# Patient Record
Sex: MALE | Race: WHITE | NOT HISPANIC OR LATINO | URBAN - METROPOLITAN AREA
[De-identification: names, ages, dates, MRNs, and addresses within clinical notes are randomized per-mention and may not be internally consistent; named-entity substitution may affect disease eponyms.]

---

## 2017-04-08 ENCOUNTER — EMERGENCY (EMERGENCY)
Facility: HOSPITAL | Age: 52
LOS: 1 days | End: 2017-04-08
Payer: MEDICAID

## 2017-04-08 VITALS
OXYGEN SATURATION: 98 % | HEART RATE: 81 BPM | SYSTOLIC BLOOD PRESSURE: 140 MMHG | DIASTOLIC BLOOD PRESSURE: 91 MMHG | RESPIRATION RATE: 15 BRPM | TEMPERATURE: 98 F

## 2017-04-08 VITALS
DIASTOLIC BLOOD PRESSURE: 92 MMHG | TEMPERATURE: 98 F | OXYGEN SATURATION: 98 % | SYSTOLIC BLOOD PRESSURE: 145 MMHG | HEART RATE: 83 BPM | HEIGHT: 72 IN | WEIGHT: 199.96 LBS | RESPIRATION RATE: 16 BRPM

## 2017-04-08 DIAGNOSIS — Z98.1 ARTHRODESIS STATUS: Chronic | ICD-10-CM

## 2017-04-08 PROCEDURE — 99283 EMERGENCY DEPT VISIT LOW MDM: CPT | Mod: 25

## 2017-04-08 PROCEDURE — 99284 EMERGENCY DEPT VISIT MOD MDM: CPT | Mod: 25

## 2017-04-08 NOTE — ED PROVIDER NOTE - MEDICAL DECISION MAKING DETAILS
Abx drops, and follow up with ophthalmologist Abx drops, and follow up with ophthalmologist. Patient had lid eversion with no evidence of FB after tetracaine 2 drops applied for topical anesthesia. UV light and fluorescein stain showed circular corenal abrasion at 6 pm  about 2 mm in diameter with conjunctival irritation below the abrasion as well that stained + with fluorescein. Pt. had 1 cm of erythromycin ointment applied to lower lid. Pt told to apply 2 drops 4 x daily(sulfacetamide drops), avoid bright light and follow up with Eye clinic on Monday.

## 2017-04-08 NOTE — ED PROVIDER NOTE - OBJECTIVE STATEMENT
50 y/o M pt presents to ED c/o left eye pain and redness s/p ceramic tile cement got into his eye while at work. He washed out his eye and tried to get everything out but still very irritated. He states it feel likes there is something in his eye.  No other injuries. No further complaints at this time. 52 y/o M pt presents to ED c/o left eye pain and redness s/p ceramic tile cement got into his eye while at work last night. He washed out his eye and tried to get everything out but still very irritated. He states it feel likes there is something in his eye.  No other injuries. No further complaints at this time.

## 2017-04-08 NOTE — ED PROVIDER NOTE - EYES, MLM
Left eye lid inverted, no obvious sign of foreign body.  corneal abrasion 6 o'clock. abrasion on lower sclera Left eye lid inverted, no obvious sign of foreign body. positive fluorescein uptake.  corneal abrasion 6 o'clock. abrasion on lower sclera Left eye lid everted, no obvious sign of foreign body. positive fluorescein uptake.  corneal abrasion 6 o'clock. abrasion on lower sclera

## 2017-04-12 ENCOUNTER — EMERGENCY (EMERGENCY)
Facility: HOSPITAL | Age: 52
LOS: 1 days | End: 2017-04-12
Attending: EMERGENCY MEDICINE | Admitting: EMERGENCY MEDICINE
Payer: MEDICAID

## 2017-04-12 VITALS
RESPIRATION RATE: 16 BRPM | SYSTOLIC BLOOD PRESSURE: 111 MMHG | HEART RATE: 88 BPM | OXYGEN SATURATION: 96 % | DIASTOLIC BLOOD PRESSURE: 69 MMHG

## 2017-04-12 VITALS
HEIGHT: 72 IN | OXYGEN SATURATION: 96 % | DIASTOLIC BLOOD PRESSURE: 90 MMHG | WEIGHT: 209.44 LBS | HEART RATE: 102 BPM | SYSTOLIC BLOOD PRESSURE: 135 MMHG | RESPIRATION RATE: 16 BRPM | TEMPERATURE: 98 F

## 2017-04-12 DIAGNOSIS — Z98.1 ARTHRODESIS STATUS: Chronic | ICD-10-CM

## 2017-04-12 LAB
ALBUMIN SERPL ELPH-MCNC: 3.7 G/DL — SIGNIFICANT CHANGE UP (ref 3.3–5)
ALP SERPL-CCNC: 70 U/L — SIGNIFICANT CHANGE UP (ref 30–120)
ALT FLD-CCNC: 108 U/L DA — HIGH (ref 10–60)
ANION GAP SERPL CALC-SCNC: 5 MMOL/L — SIGNIFICANT CHANGE UP (ref 5–17)
AST SERPL-CCNC: 65 U/L — HIGH (ref 10–40)
BASOPHILS # BLD AUTO: 0.1 K/UL — SIGNIFICANT CHANGE UP (ref 0–0.2)
BASOPHILS NFR BLD AUTO: 0.6 % — SIGNIFICANT CHANGE UP (ref 0–2)
BILIRUB SERPL-MCNC: 0.2 MG/DL — SIGNIFICANT CHANGE UP (ref 0.2–1.2)
BUN SERPL-MCNC: 19 MG/DL — SIGNIFICANT CHANGE UP (ref 7–23)
CALCIUM SERPL-MCNC: 8.9 MG/DL — SIGNIFICANT CHANGE UP (ref 8.4–10.5)
CHLORIDE SERPL-SCNC: 103 MMOL/L — SIGNIFICANT CHANGE UP (ref 96–108)
CO2 SERPL-SCNC: 30 MMOL/L — SIGNIFICANT CHANGE UP (ref 22–31)
CREAT SERPL-MCNC: 1.19 MG/DL — SIGNIFICANT CHANGE UP (ref 0.5–1.3)
EOSINOPHIL # BLD AUTO: 0 K/UL — SIGNIFICANT CHANGE UP (ref 0–0.5)
EOSINOPHIL NFR BLD AUTO: 0.1 % — SIGNIFICANT CHANGE UP (ref 0–6)
ETHANOL SERPL-MCNC: <3 MG/DL — SIGNIFICANT CHANGE UP (ref 0–3)
GLUCOSE SERPL-MCNC: 168 MG/DL — HIGH (ref 70–99)
HCT VFR BLD CALC: 41.3 % — SIGNIFICANT CHANGE UP (ref 39–50)
HGB BLD-MCNC: 14.4 G/DL — SIGNIFICANT CHANGE UP (ref 13–17)
LYMPHOCYTES # BLD AUTO: 0.6 K/UL — LOW (ref 1–3.3)
LYMPHOCYTES # BLD AUTO: 5.3 % — LOW (ref 13–44)
MCHC RBC-ENTMCNC: 31.8 PG — SIGNIFICANT CHANGE UP (ref 27–34)
MCHC RBC-ENTMCNC: 34.8 GM/DL — SIGNIFICANT CHANGE UP (ref 32–36)
MCV RBC AUTO: 91.5 FL — SIGNIFICANT CHANGE UP (ref 80–100)
MONOCYTES # BLD AUTO: 0.9 K/UL — SIGNIFICANT CHANGE UP (ref 0–0.9)
MONOCYTES NFR BLD AUTO: 7.9 % — SIGNIFICANT CHANGE UP (ref 2–14)
NEUTROPHILS # BLD AUTO: 9.7 K/UL — HIGH (ref 1.8–7.4)
NEUTROPHILS NFR BLD AUTO: 86.2 % — HIGH (ref 43–77)
PLATELET # BLD AUTO: 163 K/UL — SIGNIFICANT CHANGE UP (ref 150–400)
POTASSIUM SERPL-MCNC: 5 MMOL/L — SIGNIFICANT CHANGE UP (ref 3.5–5.3)
POTASSIUM SERPL-SCNC: 5 MMOL/L — SIGNIFICANT CHANGE UP (ref 3.5–5.3)
PROT SERPL-MCNC: 7 G/DL — SIGNIFICANT CHANGE UP (ref 6–8.3)
RBC # BLD: 4.52 M/UL — SIGNIFICANT CHANGE UP (ref 4.2–5.8)
RBC # FLD: 12.6 % — SIGNIFICANT CHANGE UP (ref 10.3–14.5)
SODIUM SERPL-SCNC: 138 MMOL/L — SIGNIFICANT CHANGE UP (ref 135–145)
WBC # BLD: 11.3 K/UL — HIGH (ref 3.8–10.5)
WBC # FLD AUTO: 11.3 K/UL — HIGH (ref 3.8–10.5)

## 2017-04-12 PROCEDURE — 80053 COMPREHEN METABOLIC PANEL: CPT

## 2017-04-12 PROCEDURE — 70450 CT HEAD/BRAIN W/O DYE: CPT

## 2017-04-12 PROCEDURE — 93010 ELECTROCARDIOGRAM REPORT: CPT

## 2017-04-12 PROCEDURE — 93005 ELECTROCARDIOGRAM TRACING: CPT

## 2017-04-12 PROCEDURE — 85027 COMPLETE CBC AUTOMATED: CPT

## 2017-04-12 PROCEDURE — 99284 EMERGENCY DEPT VISIT MOD MDM: CPT

## 2017-04-12 PROCEDURE — 70450 CT HEAD/BRAIN W/O DYE: CPT | Mod: 26

## 2017-04-12 PROCEDURE — 99284 EMERGENCY DEPT VISIT MOD MDM: CPT | Mod: 25

## 2017-04-12 PROCEDURE — 71046 X-RAY EXAM CHEST 2 VIEWS: CPT

## 2017-04-12 PROCEDURE — 80307 DRUG TEST PRSMV CHEM ANLYZR: CPT

## 2017-04-12 PROCEDURE — 71020: CPT | Mod: 26

## 2017-04-12 RX ORDER — SODIUM CHLORIDE 9 MG/ML
2000 INJECTION INTRAMUSCULAR; INTRAVENOUS; SUBCUTANEOUS ONCE
Qty: 0 | Refills: 0 | Status: COMPLETED | OUTPATIENT
Start: 2017-04-12 | End: 2017-04-12

## 2017-04-12 RX ADMIN — SODIUM CHLORIDE 1000 MILLILITER(S): 9 INJECTION INTRAMUSCULAR; INTRAVENOUS; SUBCUTANEOUS at 16:15

## 2017-04-12 NOTE — ED PROVIDER NOTE - CONSTITUTIONAL [+], MLM
chills

## 2017-04-12 NOTE — ED PROVIDER NOTE - DETAILS:
Anselmo Montoya MD - The scribe's documentation has been prepared under my direction and personally reviewed by me in its entirety. I confirm that the note above accurately reflects all work, treatment, procedures, and medical decision making performed by me.

## 2017-04-12 NOTE — ED PROVIDER NOTE - MEDICAL DECISION MAKING DETAILS
52 yo heroin abuser snorted heroin last night, woke up today with ringing in ears. Plan-CT brain, labs.

## 2017-04-12 NOTE — ED PROVIDER NOTE - OBJECTIVE STATEMENT
52 y/o M pt presents to the ED c/o chills and ringing in the ears x 4 hours ago. Pt reports "trying" heroin 2x for depression, snorting it instead of using a needle. Pt reports he was sleeping when the symptoms presented themselves, about 5 hours, after work. Denies any other complaints. 50 y/o M pt presents to the ED c/o chills and ringing in the ears x 4 hours ago. Pt reports "trying" heroin last night for depression, snorting it. Pt reports he was sleeping when the symptoms presented themselves, about 5 hours, after work. Denies any other complaints. Denies suicidal homicidal thoughts.

## 2017-04-12 NOTE — ED PROVIDER NOTE - NS ED MD SCRIBE ATTENDING SCRIBE SECTIONS
PAST MEDICAL/SURGICAL/SOCIAL HISTORY/REVIEW OF SYSTEMS/PHYSICAL EXAM/HIV/DISPOSITION/VITAL SIGNS( Pullset)/HISTORY OF PRESENT ILLNESS

## 2017-04-12 NOTE — ED ADULT NURSE REASSESSMENT NOTE - NS ED NURSE REASSESS COMMENT FT1
1655 Reevaluated by MD. Pt requesting to go home. Stated " ringing in ear " subsided, feels better now. IV dc'd. Site clean. Instructions given to pt, with good understanding.   Pt discharged.
1615 Received report at this time. Pt asleep, easily arousble. Results of labs/xray/ct scan pending. IV fluids infusing. Respiration unlabored. CM RSR 90.

## 2017-04-12 NOTE — ED ADULT NURSE NOTE - OBJECTIVE STATEMENT
Patient c/o b/l ear ringing. States he snorted Heroin last night and thinks maybe it's from the drug.

## 2018-08-23 ENCOUNTER — TELEPHONE (OUTPATIENT)
Dept: FAMILY MEDICINE | Facility: CLINIC | Age: 53
End: 2018-08-23

## 2019-04-18 NOTE — ED PROVIDER NOTE - PMH
"Patient would like a call back RE: Medication ect.   Message Contents   Selenahardik Martinez, Melvin Ass't  ELLIE Estrada I received a call from the patient with regards to hi medication and the [employer] heart monitor.  The monitor is not due to be returned until 04/26/2019.  The patient is requesting to send the monitor back due to a lack of cell service.  The patient also had mentioned that he had a tooth ache and has had a repaired root canal since then.  The patient is thinking that this has all been a result of an infection in his mouth.     Thanks,     SK      Returned patient call and spoke with wife.  Reviewed patient concerns and he states that patient returned the monitor today because it was \"causing bad rash on chest.  He went to dentist and his root canal was pulled.  He was given antibiotics 2 weeks ago.  He does not have light-headedness or dizzy feeling.  He's actually feeling pretty good. He also reviewed the suggestions per MD and he does not want blood thinners at this time.  He also has been on a diet and is losing weight.\"  Reassurance given that update will be relayed to MD.  She states no other concerns or questions at this time.    Pt update relayed to MD.  "
No pertinent past medical history

## 2023-03-06 NOTE — ED PROVIDER NOTE - LATERALITY
Is This A New Presentation, Or A Follow-Up?: Skin Lesion What Type Of Note Output Would You Prefer (Optional)?: Bullet Format How Severe Is Your Skin Lesion?: moderate Has Your Skin Lesion Been Treated?: not been treated bilateral